# Patient Record
Sex: MALE | Race: WHITE | Employment: OTHER | URBAN - METROPOLITAN AREA
[De-identification: names, ages, dates, MRNs, and addresses within clinical notes are randomized per-mention and may not be internally consistent; named-entity substitution may affect disease eponyms.]

---

## 2024-07-19 ENCOUNTER — OFFICE VISIT (OUTPATIENT)
Dept: NEUROLOGY | Age: 79
End: 2024-07-19
Payer: COMMERCIAL

## 2024-07-19 VITALS
DIASTOLIC BLOOD PRESSURE: 72 MMHG | WEIGHT: 145.8 LBS | HEIGHT: 71 IN | SYSTOLIC BLOOD PRESSURE: 119 MMHG | BODY MASS INDEX: 20.41 KG/M2 | HEART RATE: 88 BPM | OXYGEN SATURATION: 96 %

## 2024-07-19 DIAGNOSIS — R26.2 DIFFICULTY IN WALKING: Primary | ICD-10-CM

## 2024-07-19 DIAGNOSIS — Z86.73 HISTORY OF CVA (CEREBROVASCULAR ACCIDENT): ICD-10-CM

## 2024-07-19 PROCEDURE — 99204 OFFICE O/P NEW MOD 45 MIN: CPT | Performed by: PHYSICAL THERAPIST

## 2024-07-19 PROCEDURE — 1123F ACP DISCUSS/DSCN MKR DOCD: CPT | Performed by: PHYSICAL THERAPIST

## 2024-07-19 RX ORDER — ATORVASTATIN CALCIUM 20 MG/1
20 TABLET, FILM COATED ORAL DAILY
COMMUNITY
Start: 2021-02-22 | End: 2025-04-10

## 2024-07-19 NOTE — PROGRESS NOTES
Arm, Position: Sitting)   Pulse 88   Ht 1.803 m (5' 11\")   Wt 66.1 kg (145 lb 12.8 oz)   SpO2 96%   BMI 20.33 kg/m²     General - Well developed, well nourished, in no apparent distress. Pleasant and conversent.   HEENT - Normocephalic, atraumatic. Conjunctiva, tympanic membranes, and oropharynx are clear.   Neck - Supple without masses, no bruits   Cardiovascular - Regular rate and rhythm. Normal S1, S2 without murmurs, rubs, or gallops.  Lungs - Clear to auscultation.  Abdomen - Soft, nontender with normal bowel sounds.   Extremities - Peripheral pulses intact. No edema and no rashes.     Neurological examination - Comprehension, attention , memory and reasoning are intact. Language and speech are normal. On cranial nerve examination pupils are equal round and reactive to light. Fundoscopic examination is normal. Visual acuity is adequate. Visual fields are full to finger confrontation. Extraocular motility is normal. Face is symmetric and sensation is intact to light touch. Hearing is intact to finger rustle bilaterally. Motor examination - There is normal muscle tone and bulk. Power is full throughout. Muscle stretch reflexes are normoactive and there are no pathological reflexes present. Sensation is intact to light touch, pinprick, vibration and proprioception in all extremities.  No dysmetria or dysdiadochokinesia.  Gait demonstrates decreased step length bilaterally, and some decreased balance while turning, more to the left than right.              Most recent MRI -I was unable to personally review these images however I did review the results as written by an med    IMPRESSION:   1. FOCAL MIXED SIGNAL INTENSITY AREA IN THE LEFT CEREBRAL HEMISPHERE MEASURING   UP TO 0.5 X 2.1 X 1.4 CM. THIS HAS THE APPEARANCE OF A CHRONIC INTRAPARENCHYMAL   HEMORRHAGE OR OTHER FOCAL LESION WOULD RECOMMEND POSTCONTRAST IMAGES FOR FURTHER   EVALUATION. LOW SIGNAL INTENSITY SEEN ON GRADIENT ECHO IMAGES INDICATE SOME

## 2024-07-22 ASSESSMENT — ENCOUNTER SYMPTOMS
RESPIRATORY NEGATIVE: 1
BACK PAIN: 1
GASTROINTESTINAL NEGATIVE: 1
EYES NEGATIVE: 1

## 2025-02-24 ENCOUNTER — OFFICE VISIT (OUTPATIENT)
Dept: NEUROLOGY | Age: 80
End: 2025-02-24
Payer: MEDICARE

## 2025-02-24 VITALS
DIASTOLIC BLOOD PRESSURE: 76 MMHG | BODY MASS INDEX: 23.66 KG/M2 | HEART RATE: 95 BPM | HEIGHT: 71 IN | WEIGHT: 169 LBS | SYSTOLIC BLOOD PRESSURE: 141 MMHG | OXYGEN SATURATION: 95 %

## 2025-02-24 DIAGNOSIS — R26.2 DIFFICULTY IN WALKING: Primary | ICD-10-CM

## 2025-02-24 DIAGNOSIS — Z86.73 HISTORY OF CVA (CEREBROVASCULAR ACCIDENT): ICD-10-CM

## 2025-02-24 PROCEDURE — 1126F AMNT PAIN NOTED NONE PRSNT: CPT | Performed by: PHYSICAL THERAPIST

## 2025-02-24 PROCEDURE — 1123F ACP DISCUSS/DSCN MKR DOCD: CPT | Performed by: PHYSICAL THERAPIST

## 2025-02-24 PROCEDURE — 1159F MED LIST DOCD IN RCRD: CPT | Performed by: PHYSICAL THERAPIST

## 2025-02-24 PROCEDURE — 99213 OFFICE O/P EST LOW 20 MIN: CPT | Performed by: PHYSICAL THERAPIST

## 2025-02-24 ASSESSMENT — ENCOUNTER SYMPTOMS
RESPIRATORY NEGATIVE: 1
EYES NEGATIVE: 1
BACK PAIN: 1
GASTROINTESTINAL NEGATIVE: 1

## 2025-02-24 ASSESSMENT — PATIENT HEALTH QUESTIONNAIRE - PHQ9
SUM OF ALL RESPONSES TO PHQ9 QUESTIONS 1 & 2: 0
SUM OF ALL RESPONSES TO PHQ QUESTIONS 1-9: 0
1. LITTLE INTEREST OR PLEASURE IN DOING THINGS: NOT AT ALL
SUM OF ALL RESPONSES TO PHQ QUESTIONS 1-9: 0
SUM OF ALL RESPONSES TO PHQ QUESTIONS 1-9: 0
2. FEELING DOWN, DEPRESSED OR HOPELESS: NOT AT ALL
SUM OF ALL RESPONSES TO PHQ QUESTIONS 1-9: 0

## 2025-02-24 NOTE — PROGRESS NOTES
walking  -Again, suspect to be multifactorial.  Some of this due to aging, also some of this due to the psychological stressor of losing his wife recently.  He is doing better overall from a physical and psychological standpoint, and his symptoms have improved.  The symptoms may worsen in times of physiologic or psychiatric stress.  I educated him and his sister on this.  Acknowledged understanding.  No further workup required.      History of CVA (cerebrovascular accident)  -Significant vascular involvement seen on MRI.  Again, this is multifactorial  -Suspect some of this due to comorbidities such as cancer and HLD.  -Secondary prevention is warranted  -Initiate aspirin daily 81 mg  -Reviewed patient's cholesterol.  LDL has been under 70.  Given that his LDL is well-controlled, no need for atorvastatin this time.  If LDL is above 70, would recommend starting fish oil and rechecking lipids  -Educated on BEFAST and other strokelike symptoms that he may need to seek emergency medical attention for                  I spent  25 total minutes of today's visit in coordination of care and patient/family education and counseling regarding the above patient concerns, reviewing the patient's medical record, my assessment and recommendations.       CRISTIAN Vogt JR  [facility] [address], [address]  [place] [id number]  Phone:[phone]